# Patient Record
Sex: FEMALE | Race: WHITE | NOT HISPANIC OR LATINO | Employment: OTHER | URBAN - METROPOLITAN AREA
[De-identification: names, ages, dates, MRNs, and addresses within clinical notes are randomized per-mention and may not be internally consistent; named-entity substitution may affect disease eponyms.]

---

## 2023-12-04 ENCOUNTER — OFFICE VISIT (OUTPATIENT)
Dept: URGENT CARE | Facility: URGENT CARE | Age: 78
End: 2023-12-04
Payer: COMMERCIAL

## 2023-12-04 VITALS
RESPIRATION RATE: 16 BRPM | DIASTOLIC BLOOD PRESSURE: 82 MMHG | OXYGEN SATURATION: 96 % | SYSTOLIC BLOOD PRESSURE: 135 MMHG | HEART RATE: 78 BPM | WEIGHT: 164.5 LBS | TEMPERATURE: 97.7 F

## 2023-12-04 DIAGNOSIS — J03.90 TONSILLITIS WITH EXUDATE: Primary | ICD-10-CM

## 2023-12-04 DIAGNOSIS — H66.002 NON-RECURRENT ACUTE SUPPURATIVE OTITIS MEDIA OF LEFT EAR WITHOUT SPONTANEOUS RUPTURE OF TYMPANIC MEMBRANE: ICD-10-CM

## 2023-12-04 LAB
DEPRECATED S PYO AG THROAT QL EIA: NEGATIVE
GROUP A STREP BY PCR: NOT DETECTED

## 2023-12-04 PROCEDURE — 99203 OFFICE O/P NEW LOW 30 MIN: CPT | Performed by: FAMILY MEDICINE

## 2023-12-04 PROCEDURE — 87651 STREP A DNA AMP PROBE: CPT | Performed by: FAMILY MEDICINE

## 2023-12-04 RX ORDER — ASPIRIN 81 MG/1
81 TABLET ORAL
COMMUNITY

## 2023-12-04 RX ORDER — ATORVASTATIN CALCIUM 20 MG/1
20 TABLET, FILM COATED ORAL DAILY
COMMUNITY

## 2023-12-04 RX ORDER — CEFDINIR 300 MG/1
300 CAPSULE ORAL 2 TIMES DAILY
Qty: 20 CAPSULE | Refills: 0 | Status: SHIPPED | OUTPATIENT
Start: 2023-12-04 | End: 2023-12-14

## 2023-12-04 NOTE — PROGRESS NOTES
ASSESSMENT/PLAN:      ICD-10-CM    1. Tonsillitis with exudate  J03.90 Streptococcus A Rapid Screen w/Reflex to PCR - Clinic Collect     Group A Streptococcus PCR Throat Swab     cefdinir (OMNICEF) 300 MG capsule      2. Non-recurrent acute suppurative otitis media of left ear without spontaneous rupture of tympanic membrane  H66.002 cefdinir (OMNICEF) 300 MG capsule          Patient Instructions   Start cefdinir 1 tablet 2 times a day for 10 days for tonsillitis and left ear infection, if your second strep test comes back positive this medication will treat the strep as well    After being on antibiotics for 24 hours you are no longer can consider contagious        Return to clinic or to ER if symptoms worsen     Follow up with your primary care provider or clinic in about 2-3 days  if your symptoms do not improve          Reviewed medication instructions and side effects. Follow up if experiences side effects.     I reviewed supportive care, otc meds to use if needed, expected course, and signs of concern.  Follow up as needed or if she does not improve within  1-2 days or if worsens in any way.  Reviewed red flag symptoms and is to go to the ER if experiences any of these.     The use of Dragon/Mammotome dictation services may have been used to construct the content in this note; any grammatical or spelling errors are non-intentional. Please contact the author of this note directly if you are in need of any clarification.                Patient presents with:  Pharyngitis: Sore throat for 5-6 days, cold sx.        Subjective     Nataly Miner is a 78 year old female who presents to clinic today for the following health issues:    HPI   Sore throat for 5 days  pain with swallowing, even liquids,able to swallow her saliva  Pain on left > right, pain in left ear comes and goes, onset at time of sore throat   Mild congestion for last week  No fever chills no fever chills,  No cough, no sinus pain,   no shortness  breath or chest pain   No myalgias or fatigue  No n/v/d  Hx of GERD - taking omeprazole-no worsening symptoms, no hx of sore throat associated with GERD in past       No past medical history on file.  Social History     Tobacco Use    Smoking status: Not on file    Smokeless tobacco: Not on file   Substance Use Topics    Alcohol use: Not on file       Current Outpatient Medications   Medication Sig Dispense Refill    aspirin 81 MG EC tablet Take 81 mg by mouth      atorvastatin (LIPITOR) 20 MG tablet Take 20 mg by mouth daily      omeprazole (PRILOSEC) 20 MG DR capsule TAKE 1 CAPSULE BY MOUTH EVERY DAY BEFORE MEALS      ascorbic acid 1000 MG TABS tablet ASCORBIC ACID 1000 MG TABS       Allergies   Allergen Reactions    Penicillins Other (See Comments)     LOCAL RASH, hardened area at injection site     Patient Active Problem List    Diagnosis    Osteoarthritis    Dementia (H)    Osteoporosis    Cystocele, midline    Recurrent UTI    Osteoarthrosis    Dizziness and giddiness    Memory loss    Paresthesia    Speech disturbance    Chest pain    Chronic constipation    Pure hypercholesterolemia    Spinal stenosis    Huntley's esophagus    Sleep apnea            ROS are negative, except as otherwise noted HPI      Objective    /82 (BP Location: Left arm, Patient Position: Sitting, Cuff Size: Adult Regular)   Pulse 78   Temp 97.7  F (36.5  C) (Tympanic)   Resp 16   Wt 74.6 kg (164 lb 8 oz)   SpO2 96%   There is no height or weight on file to calculate BMI.  Physical Exam     GENERAL: Alert, mild distress.due to sore throat, fatigued   HEENT: Diffuse pharyngeal erythema, tonsillar tissue erythematous, + exudate    Sclera, lids and conjunctiva are normal.  Nose mild congestion and ears -tm on right dull, canal clear TM on left erythematous, dull, canal clear   NECK: shoddy anterior chain bilateral adenopathy.  CHEST:  clear, no wheezing, rhonchi or rales.  HEART:  S1 and S2 normal, no murmurs, clicks, gallops or  rubs. Regular rate and rhythm.  NEURO:Alert and oriented x3, normal strength and tone, normal gait        Diagnostic Test Results:  Labs reviewed in Epic  Results for orders placed or performed in visit on 12/04/23   Streptococcus A Rapid Screen w/Reflex to PCR - Clinic Collect     Status: Normal    Specimen: Throat; Swab   Result Value Ref Range    Group A Strep antigen Negative Negative   Group A Streptococcus PCR Throat Swab     Status: Normal    Specimen: Throat; Swab   Result Value Ref Range    Group A strep by PCR Not Detected Not Detected    Narrative    The Xpert Xpress Strep A test, performed on the [employer], is a rapid, qualitative in vitro diagnostic test for the detection of Streptococcus pyogenes (Group A ß-hemolytic Streptococcus, Strep A) in throat swab specimens from patients with signs and symptoms of pharyngitis. The Xpert Xpress Strep A test can be used as an aid in the diagnosis of Group A Streptococcal pharyngitis. The assay is not intended to monitor treatment for Group A Streptococcus infections. The Xpert Xpress Strep A test utilizes an automated real-time polymerase chain reaction (PCR) to detect Streptococcus pyogenes DNA.

## 2023-12-04 NOTE — PATIENT INSTRUCTIONS
Start cefdinir 1 tablet 2 times a day for 10 days for tonsillitis and left ear infection, if your second strep test comes back positive this medication will treat the strep as well    After being on antibiotics for 24 hours you are no longer can consider contagious        Return to clinic or to ER if symptoms worsen     Follow up with your primary care provider or clinic in about 2-3 days  if your symptoms do not improve

## 2024-01-16 PROBLEM — M19.90 OSTEOARTHRITIS: Status: ACTIVE | Noted: 2024-01-16

## 2024-01-16 PROBLEM — M81.0 OSTEOPOROSIS: Status: ACTIVE | Noted: 2019-04-30

## 2024-01-16 PROBLEM — N81.11 CYSTOCELE, MIDLINE: Status: ACTIVE | Noted: 2018-05-24

## 2024-01-16 PROBLEM — F03.90 DEMENTIA (H): Status: ACTIVE | Noted: 2023-03-31

## 2024-04-24 PROBLEM — M81.0 OSTEOPOROSIS: Chronic | Status: ACTIVE | Noted: 2019-04-30

## 2024-04-24 PROBLEM — F03.90 DEMENTIA (H): Chronic | Status: ACTIVE | Noted: 2023-03-31

## 2024-04-25 ENCOUNTER — OFFICE VISIT (OUTPATIENT)
Dept: SLEEP MEDICINE | Facility: CLINIC | Age: 79
End: 2024-04-25
Payer: COMMERCIAL

## 2024-04-25 VITALS
OXYGEN SATURATION: 95 % | HEART RATE: 74 BPM | DIASTOLIC BLOOD PRESSURE: 81 MMHG | WEIGHT: 168.8 LBS | RESPIRATION RATE: 16 BRPM | BODY MASS INDEX: 31.87 KG/M2 | HEIGHT: 61 IN | SYSTOLIC BLOOD PRESSURE: 143 MMHG

## 2024-04-25 DIAGNOSIS — F51.04 CHRONIC INSOMNIA: ICD-10-CM

## 2024-04-25 DIAGNOSIS — G47.30 SLEEP APNEA, UNSPECIFIED TYPE: Primary | ICD-10-CM

## 2024-04-25 DIAGNOSIS — F03.A0 MILD DEMENTIA WITHOUT BEHAVIORAL DISTURBANCE, PSYCHOTIC DISTURBANCE, MOOD DISTURBANCE, OR ANXIETY, UNSPECIFIED DEMENTIA TYPE (H): Chronic | ICD-10-CM

## 2024-04-25 PROBLEM — G89.29 CHRONIC PAIN OF RIGHT THUMB: Status: ACTIVE | Noted: 2024-04-12

## 2024-04-25 PROBLEM — D69.2 PURPURA (H): Status: ACTIVE | Noted: 2024-03-18

## 2024-04-25 PROBLEM — M25.561 CHRONIC PAIN OF RIGHT KNEE: Status: ACTIVE | Noted: 2024-04-12

## 2024-04-25 PROBLEM — G89.29 CHRONIC PAIN OF RIGHT KNEE: Status: ACTIVE | Noted: 2024-04-12

## 2024-04-25 PROBLEM — M79.644 CHRONIC PAIN OF RIGHT THUMB: Status: ACTIVE | Noted: 2024-04-12

## 2024-04-25 PROCEDURE — 99205 OFFICE O/P NEW HI 60 MIN: CPT | Performed by: INTERNAL MEDICINE

## 2024-04-25 RX ORDER — GARLIC 180 MG
1 TABLET, DELAYED RELEASE (ENTERIC COATED) ORAL DAILY
COMMUNITY

## 2024-04-25 RX ORDER — MULTIVITAMIN
1 TABLET ORAL DAILY
COMMUNITY

## 2024-04-25 ASSESSMENT — SLEEP AND FATIGUE QUESTIONNAIRES
HOW LIKELY ARE YOU TO NOD OFF OR FALL ASLEEP IN A CAR, WHILE STOPPED FOR A FEW MINUTES IN TRAFFIC: WOULD NEVER DOZE
HOW LIKELY ARE YOU TO NOD OFF OR FALL ASLEEP WHEN YOU ARE A PASSENGER IN A CAR FOR AN HOUR WITHOUT A BREAK: SLIGHT CHANCE OF DOZING
HOW LIKELY ARE YOU TO NOD OFF OR FALL ASLEEP WHILE SITTING AND TALKING TO SOMEONE: WOULD NEVER DOZE
HOW LIKELY ARE YOU TO NOD OFF OR FALL ASLEEP WHILE SITTING INACTIVE IN A PUBLIC PLACE: WOULD NEVER DOZE
HOW LIKELY ARE YOU TO NOD OFF OR FALL ASLEEP WHILE LYING DOWN TO REST IN THE AFTERNOON WHEN CIRCUMSTANCES PERMIT: SLIGHT CHANCE OF DOZING
HOW LIKELY ARE YOU TO NOD OFF OR FALL ASLEEP WHILE SITTING AND READING: SLIGHT CHANCE OF DOZING
HOW LIKELY ARE YOU TO NOD OFF OR FALL ASLEEP WHILE WATCHING TV: WOULD NEVER DOZE
HOW LIKELY ARE YOU TO NOD OFF OR FALL ASLEEP WHILE SITTING QUIETLY AFTER LUNCH WITHOUT ALCOHOL: WOULD NEVER DOZE

## 2024-04-25 NOTE — PATIENT INSTRUCTIONS
Consider use of lightbox (10,000 lux, full spectrum or 450-500 nanometers) for 30-60 minutes every evening.   Don't start day or turn things on until after 5 AM   Avoid light exposure, consider use of sunglasses for the first 2 hours of the morning  Consider use of melatonin 1-3 mg in morning

## 2024-04-25 NOTE — PROGRESS NOTES
Outpatient Sleep Medicine Consultation:      Name: Nataly Miner MRN# [id number]   Age: 78 year old YOB: 1945     Date of Consultation: April 25, 2024  Consultation is requested by: No referring provider defined for this encounter. No ref. provider found  Primary care provider: [facility] - [facility]       Reason for Sleep Consult:           Patient s Reason for visit  Nataly Miner main reason for visit: declining short term memory  Patient states problem(s) started: 3 years  Nataly Miner's goals for this visit: to find out more about her memory loss           Assessment and Plan:     Obstructive sleep apnea, unknown severity  Recently untreated with symptoms of sleep maintenance difficulties, possibly unrelated   Comorbid dementia  Not using PAP recently due to basal congestion, but wants to continue  We do not need to treat her JOSH given her age, lack of co-morbidiities or apparent symptoms   - Get records from [facility], need sleep study   - Continue current treatments.   - Trial of benadryl if needed  - Wants to change DME to [facility]. Order supplies when sleep study available      Sleep maintenance difficulties (WALTER 26)  Difficult history due to memory problems   Suspect advanced sleep phase syndrome complicated by sleep disordered breathing.  We discussed stimulus control and regular wake times.   - See patient instructions         I spent 35 minutes with patient including counseling, and 10+10 minutes with chart review, and documentation              History of Present Illness:     DME ?unknown in [place], wants to change DME to [facility]     She had a sleep study in 1990s, but does not know the location or the reason why    She was presscribed CPAP     She has not seen a sleep doctor since about 2018    She stopped using CPAP about 1-2 year ago. She does not recall why. She was frustrated because DME kept sending her supplies that she did not need. She guzman  not know the name of the DME    Her CPAP is in her possesion     Patient is here with a friend from [facility]    What type of mask do you use:   nasal pillows    ResMed   Auto-PAP 7 - 9 cmH2O 90 day usage data:    42% of days with > 4 hours of use. 51/90 days with no use.   Average use 4' 38 minutes per day.   95%ile Leak 15 L/min.   CPAP 95% pressure 8 cm.   AHI 3.6 events per hour.      When we discuss the download she does initially not seem to recall she was using it  Then she recall she did use and stopped because of nasal congestion and concer it was 'contaminated  She does not recall if she had problems staying asleep with CPAP on       SLEEP-WAKE SCHEDULE:     Work/School Days: Patient goes to school/work: No   Usually gets into bed at 10pm  Takes patient about pretty fast to fall asleep  Has trouble falling asleep 0 nights per week  Wakes up in the middle of the night 1 times.  Wakes up due to Use the bathroom  She has trouble falling back asleep 7 nights per week times a week   This has been a problem for about a year or longer. She is not sleepy   She rolls aroun\d, lays there, thinks   It usually takes Doesnt fall back asleep at all after 3-4, she will just get up and watch TV  Patient is usually up at 5am  Uses alarm: No    Weekends/Non-work Days/All Other Days:  Usually gets into bed at 10pm   Takes patient about not long to fall asleep  Patient is usually up at 5 am  Uses alarm:      Sleep Need  Patient gets  5 hours at the most sleep on average   Patient thinks she needs about 7hours sleep    Nataly Miner prefers to sleep in this position(s): Back   Patient states they do the following activities in bed:      Naps  Patient takes a purposeful nap 3times a week times a week at 3 PM and naps are usually 1 hour approx in duration  She feels better after a nap: No  She dozes off unintentionally 3 to 4 days per week  Patient has had a driving accident or near-miss due to sleepiness/drowsiness:  No      SLEEP DISRUPTIONS:    Breathing/Snoring  Patient snores:Yes  Other people complain about her snoring: No  Patient has been told she stops breathing in her sleep:Yes  She has issues with the following:      Movement:  Patient gets pain, discomfort, with an urge to move:  No  It happens when she is resting:  No  It happens more at night:  No  Patient has been told she kicks her legs at night:  No     Behaviors in Sleep:  Nataly Miner has experienced the following behaviors while sleeping:    She has experienced sudden muscle weakness during the day: No      Is there anything else you would like your sleep provider to know:      She denies fatigue, Excessive daytime sleepiness     CAFFEINE AND OTHER SUBSTANCES:    Patient consumes caffeinated beverages per day:  1 to 3  Last caffeine use is usually: noonish  List of any prescribed or over the counter stimulants that patient takes: none  List of any prescribed or over the counter sleep medication patient takes: none  List of previous sleep medications that patient has tried: none  Patient drinks alcohol to help them sleep: No  Patient drinks alcohol near bedtime: Yes    Family History:  Patient has a family member been diagnosed with a sleep disorder: No            SCALES:    EPWORTH SLEEPINESS SCALE         4/25/2024    12:45 PM    [place] Sleepiness Scale ( PATY Ewing  [id number]<br>ESS - [place]/English - Final version - 21 Nov 07 - [facility] [place].)   Sitting and reading Slight chance of dozing   Watching TV Would never doze   Sitting, inactive in a public place (e.g. a theatre or a meeting) Would never doze   As a passenger in a car for an hour without a break Slight chance of dozing   Lying down to rest in the afternoon when circumstances permit Slight chance of dozing   Sitting and talking to someone Would never doze   Sitting quietly after a lunch without alcohol Would never doze   In a car, while stopped for a few minutes in traffic Would never doze    [place] Score (MC) 3   [place]oa Score (Sleep) 3         INSOMNIA SEVERITY INDEX (WALTER)          4/25/2024    12:45 PM   Insomnia Severity Index (WALTER)   Difficulty falling asleep 2   Difficulty staying asleep 4   Problems waking up too early 4   How SATISFIED/DISSATISFIED are you with your CURRENT sleep pattern? 4   How NOTICEABLE to others do you think your sleep problem is in terms of impairing the quality of your life? 4   How WORRIED/DISTRESSED are you about your current sleep problem? 4   To what extent do you consider your sleep problem to INTERFERE with your daily functioning (e.g. daytime fatigue, mood, ability to function at work/daily chores, concentration, memory, mood, etc.) CURRENTLY? 4   WALTER Total Score 26       Guidelines for Scoring/Interpretation:  Total score categories:  0-7 = No clinically significant insomnia   8-14 = Subthreshold insomnia   15-21 = Clinical insomnia (moderate severity)  22-28 = Clinical insomnia (severe)  Used via courtesy of www.Ry[place].va.gov with permission from Kyree Gorman PhD., [facility]           Allergies:    Allergies   Allergen Reactions    Penicillins Other (See Comments)     LOCAL RASH, hardened area at injection site       Medications:    Current Outpatient Medications   Medication Sig Dispense Refill    ascorbic acid 1000 MG TABS tablet ASCORBIC ACID 1000 MG TABS      aspirin 81 MG EC tablet Take 81 mg by mouth      atorvastatin (LIPITOR) 20 MG tablet Take 20 mg by mouth daily      CALCIUM PO Take 1 tablet by mouth daily      ginkgo biloba 60 MG CAPS capsule Take 1 capsule by mouth daily      MELATONIN PO Take 1 tablet by mouth at bedtime      multivitamin w/minerals (MULTI-VITAMIN) tablet Take 1 tablet by mouth daily      omeprazole (PRILOSEC) 20 MG DR capsule TAKE 1 CAPSULE BY MOUTH EVERY DAY BEFORE MEALS      VITAMIN D PO Take 1 tablet by mouth daily         Problem List:  Patient Active Problem List    Diagnosis Date Noted    Dementia (H) 03/31/2023      Priority: High    Pure hypercholesterolemia 02/28/2013     Priority: Medium    Huntley's esophagus 07/12/2007     Priority: Medium     Formatting of this note might be different from the original.   EGD X 2. [place]   EGD and biopsy 7/2007      Sleep apnea 09/18/2006     Priority: Medium     Moderate JOSH with significant desaturation  8/30/2006.        Chronic pain of right knee 04/12/2024     Priority: Low    Chronic pain of right thumb 04/12/2024     Priority: Low    Purpura (H24) 03/18/2024     Priority: Low    Osteoarthritis 01/16/2024     Priority: Low    Osteoporosis 04/30/2019     Priority: Low      Had severe pain with fosamax.       Cystocele, midline 05/24/2018     Priority: Low    Recurrent UTI 06/27/2016     Priority: Low    Paresthesia 05/07/2014     Priority: Low    Chronic constipation 02/28/2013     Priority: Low    Spinal stenosis 12/11/2012     Priority: Low      2/26/2013 - Revision Decompression L5-S1, Decompression L4-5      Diverticular disease of colon 09/06/2012     Priority: Low        Past Medical/Surgical History:  Past Medical History:   Diagnosis Date    Chest pain 10/16/2013    Dizziness and giddiness 05/07/2014    Speech disturbance 05/07/2014     Past Surgical History:   Procedure Laterality Date    BACK SURGERY  12/11/2012    DECOMPRESSION L5-S1 12/11/2012    BACK SURGERY  02/26/2013    LUMBAR 4-5, LUMBAR 5 - SACRAL 1 REVISION DECOMPRESSION 2/26/13   LUMBAR 4-5, LUMBAR 5 - SACRAL 1 REVISION DECOMPRESSION    CHOLECYSTECTOMY  09/1996    HYSTERECTOMY  1991    FLAKO BSO 1991 DR MCGRATH 1991 total hysterctomy and Marshal/ [place]    RECTOCELE REPAIR  12/01/2008    REPAIR Rectocele, ANTERIOR AND POSTERIOR VAGINAL 12/1/08   Dr Coello    TONSILLECTOMY  1952       Social History:  Social History     Socioeconomic History    Marital status:      Spouse name: Not on file    Number of children: Not on file    Years of education: Not on file    Highest education level: Not on file  "  Occupational History    Occupation: Retired OT provider at [employer]   Tobacco Use    Smoking status: Never     Passive exposure: Past    Smokeless tobacco: Never   Vaping Use    Vaping status: Never Used   Substance and Sexual Activity    Alcohol use: Yes     Comment: 1/2 cup wine at night    Drug use: Never    Sexual activity: Not on file   Other Topics Concern    Not on file   Social History Narrative    Not on file     Social Determinants of Health     Financial Resource Strain: Low Risk  (3/18/2024)    [facility]    Financial Resource Strain     Difficulty of Paying Living Expenses: 3     Difficulty of Paying Living Expenses: Not on file   Food Insecurity: Food Insecurity Present (3/18/2024)    [facility]    Food Insecurity     Worried About Running Out of Food in the Last Year: 2   Transportation Needs: No Transportation Needs (3/18/2024)    Received from [facility]    Transportation Needs     Lack of Transportation (Medical): 1   Physical Activity: Not on file   Stress: Not on file   Social Connections: Socially Integrated (3/18/2024)    Received from [facility]    Social Connections     Frequency of Communication with Friends and Family: 0   Interpersonal Safety: Not on file   Housing Stability: Low Risk  (3/18/2024)    Received from [facility]    Housing Stability     Unable to Pay for Housing in the Last Year: 1       Family History:  No family history on file.      Physical Examination:  Vitals: BP (!) 143/81   Pulse 74   Resp 16   Ht 1.549 m (5' 1\")   Wt 76.6 kg (168 lb 12.8 oz)   SpO2 95%   BMI 31.89 kg/m    BMI= Body mass index is 31.89 kg/m .    Neck Cir (cm): 34 cm    SpO2 Readings from Last 4 Encounters:   04/25/24 95%   12/04/23 96%       GENERAL APPEARANCE: alert and no distress  EYES: Eyes grossly " normal to inspection  HENT: mouth without ulcers or lesions  NECK: not generous size  LUNGS: no shortness of breath , cough  NEURO: mentation intact, speech normal and cranial nerves 2-12 appear intact  PSYCH: affect normal/bright           Data: All pertinent previous laboratory data reviewed     No results for input(s)

## 2024-04-25 NOTE — NURSING NOTE
"Chief Complaint   Patient presents with    Sleep Problem     Patient presents to clinic for a consult on concerns with significant memory loss.       Initial BP (!) 143/81   Pulse 74   Resp 16   Ht 1.549 m (5' 1\")   Wt 76.6 kg (168 lb 12.8 oz)   SpO2 95%   BMI 31.89 kg/m   Estimated body mass index is 31.89 kg/m  as calculated from the following:    Height as of this encounter: 1.549 m (5' 1\").    Weight as of this encounter: 76.6 kg (168 lb 12.8 oz).    Medication Reconciliation: complete  ESS: 3  Neck circumference: 13.50 inches / 34 centimeters.  DME: N/A  Deborah Mary CMA      "

## 2024-04-25 NOTE — NURSING NOTE
Faxed T[facility] to [facility] at [phone] requested that they fax us patient's sleep study. 1 year appointment reminder will be sent via My Chart.   Deborah Mary, RIKI

## 2024-04-26 ENCOUNTER — TELEPHONE (OUTPATIENT)
Dept: SLEEP MEDICINE | Facility: CLINIC | Age: 79
End: 2024-04-26
Payer: COMMERCIAL

## 2024-04-26 DIAGNOSIS — G47.33 OSA (OBSTRUCTIVE SLEEP APNEA): Primary | Chronic | ICD-10-CM

## 2024-04-26 NOTE — TELEPHONE ENCOUNTER
Received records from [facility] today. They have been scanned into chart. Sleep Study in PROC. And Historical Sleep Medicine Progress notes in MEDIA. Both attached to this visit. TUYET also scanned into MEDIA. Can provider please send order for DME supplies to [facility].  Deborah Mary CMA

## 2024-04-26 NOTE — NURSING NOTE
Received records from [facility] today. They have been scanned into chart. Sleep Study in PROC. And Historical Sleep Medicine Progress notes in MEDIA. Both attached to this visit. TUYET also scanned into MEDIA.  Deborah Mary CMA

## 2024-04-27 NOTE — TELEPHONE ENCOUNTER
Polysomnogram [facility] 8/30/06 (173#)  - AHI 20, RDI 23, low O2 78% (while supine, non-REM)  - CPAP titrated to 8 with REM supine seen

## 2024-06-27 ENCOUNTER — TELEPHONE (OUTPATIENT)
Dept: SLEEP MEDICINE | Facility: CLINIC | Age: 79
End: 2024-06-27
Payer: COMMERCIAL

## 2024-06-27 NOTE — TELEPHONE ENCOUNTER
FYI - Status Update    Who is Calling: patient    Update: Patient is calling in as she thinks that she should be having a sleep study done, patient stopped using her cpap due to age of the cpap and not sure how effective it is.  Patient's goal is to start using her cpap machine again.  Patient has short term memory loss and feels like if she used the cpap it could help with her memory.     Does caller want a call/response back: Yes     Okay to leave a detailed message?: Yes at Cell number on file:    Telephone Information:   Mobile [phone]

## 2024-06-27 NOTE — TELEPHONE ENCOUNTER
Chart reviewed.     Last ov 4/25/24    Routing to provider to advise    Debora KENNEDY RN  [facility]

## 2024-06-28 NOTE — TELEPHONE ENCOUNTER
Patient notified supply order was sent and good for one year. Phone number for [facility] provided. Reminded patient to follow up in one year.    Debora KENNEDY RN  [facility]

## 2024-08-08 ENCOUNTER — TELEPHONE (OUTPATIENT)
Dept: SLEEP MEDICINE | Facility: CLINIC | Age: 79
End: 2024-08-08
Payer: COMMERCIAL

## 2025-03-04 ENCOUNTER — OFFICE VISIT (OUTPATIENT)
Dept: URGENT CARE | Facility: URGENT CARE | Age: 80
End: 2025-03-04
Payer: COMMERCIAL

## 2025-03-04 ENCOUNTER — TELEPHONE (OUTPATIENT)
Dept: URGENT CARE | Facility: URGENT CARE | Age: 80
End: 2025-03-04

## 2025-03-04 VITALS
WEIGHT: 166.5 LBS | SYSTOLIC BLOOD PRESSURE: 128 MMHG | HEART RATE: 95 BPM | BODY MASS INDEX: 31.46 KG/M2 | RESPIRATION RATE: 24 BRPM | TEMPERATURE: 98.4 F | DIASTOLIC BLOOD PRESSURE: 80 MMHG | OXYGEN SATURATION: 96 %

## 2025-03-04 DIAGNOSIS — R07.0 THROAT PAIN: Primary | ICD-10-CM

## 2025-03-04 LAB
DEPRECATED S PYO AG THROAT QL EIA: NEGATIVE
S PYO DNA THROAT QL NAA+PROBE: NOT DETECTED

## 2025-03-04 PROCEDURE — 87651 STREP A DNA AMP PROBE: CPT | Performed by: PHYSICIAN ASSISTANT

## 2025-03-04 PROCEDURE — 99213 OFFICE O/P EST LOW 20 MIN: CPT | Performed by: PHYSICIAN ASSISTANT

## 2025-03-04 RX ORDER — LIDOCAINE HYDROCHLORIDE 20 MG/ML
15 SOLUTION OROPHARYNGEAL
Qty: 100 ML | Refills: 0 | Status: SHIPPED | OUTPATIENT
Start: 2025-03-04

## 2025-03-04 ASSESSMENT — ENCOUNTER SYMPTOMS
SORE THROAT: 1
SHORTNESS OF BREATH: 0
NAUSEA: 0
CHILLS: 0
PALPITATIONS: 0
DIARRHEA: 0
SINUS PAIN: 0
CARDIOVASCULAR NEGATIVE: 1
WHEEZING: 0
FEVER: 0
FATIGUE: 0
VOMITING: 0
SINUS PRESSURE: 0
HEADACHES: 1
COUGH: 0
RHINORRHEA: 0

## 2025-03-04 NOTE — PROGRESS NOTES
Subjective   Nataly is a 79 year old, presenting for the following health issues:  Pharyngitis (C/o sore throat and cold sx for over a week, would like to make sure it is not strep. ), Allergies (Sneezing over a week), and Nasal Congestion (Stuffy nose over a week )    HPI    Acute Illness  Acute illness concerns:   Onset/Duration: 7-8 days ago  Symptoms:  Fever: No  Chills/Sweats: No  Headache (location?): Sometimes  Sinus Pressure: No  Conjunctivitis:  No  Ear Pain: no  Rhinorrhea: No  Congestion: No  Sore Throat: YES  Cough: no  Wheeze: No  Decreased Appetite: No  Nausea: No  Vomiting: No  Diarrhea: No  Dysuria/Freq.: No  Dysuria or Hematuria: No  Fatigue/Achiness: No  Sick/Strep Exposure: No  Therapies tried and outcome: tea, tylenol  Patient Active Problem List   Diagnosis    Huntley's esophagus    Chronic constipation    Cystocele, midline    Dementia (H)    Osteoarthritis    Osteoporosis    Paresthesia    Pure hypercholesterolemia    Recurrent UTI    JOSH (obstructive sleep apnea) - moderate (AHI 20)    Spinal stenosis    Chronic pain of right knee    Chronic pain of right thumb    Diverticular disease of colon    Purpura    Chronic insomnia     Current Outpatient Medications   Medication Sig Dispense Refill    ascorbic acid 1000 MG TABS tablet ASCORBIC ACID 1000 MG TABS      aspirin 81 MG EC tablet Take 81 mg by mouth      atorvastatin (LIPITOR) 20 MG tablet Take 20 mg by mouth daily      CALCIUM PO Take 1 tablet by mouth daily      ginkgo biloba 60 MG CAPS capsule Take 1 capsule by mouth daily      lidocaine, viscous, (XYLOCAINE) 2 % solution Swish and spit 15 mLs in mouth every 3 hours as needed for moderate pain. ; Max 8 doses/24 hour period. 100 mL 0    MELATONIN PO Take 1 tablet by mouth at bedtime      multivitamin w/minerals (MULTI-VITAMIN) tablet Take 1 tablet by mouth daily      omeprazole (PRILOSEC) 20 MG DR capsule TAKE 1 CAPSULE BY MOUTH EVERY DAY BEFORE MEALS      VITAMIN D PO Take 1 tablet by  mouth daily       No current facility-administered medications for this visit.        Allergies   Allergen Reactions    Penicillins Other (See Comments)     LOCAL RASH, hardened area at injection site     Review of Systems   Constitutional:  Negative for chills, fatigue and fever.   HENT:  Positive for sneezing and sore throat. Negative for congestion, ear pain, rhinorrhea, sinus pressure and sinus pain.    Respiratory:  Negative for cough, shortness of breath and wheezing.    Cardiovascular: Negative.  Negative for chest pain, palpitations and leg swelling.   Gastrointestinal:  Negative for diarrhea, nausea and vomiting.   Neurological:  Positive for headaches.   All other systems reviewed and are negative.          Objective    /80 (BP Location: Left arm, Patient Position: Sitting, Cuff Size: Adult Regular)   Pulse 95   Temp 98.4  F (36.9  C) (Tympanic)   Resp 24   Wt 75.5 kg (166 lb 8 oz)   SpO2 96%   BMI 31.46 kg/m    Body mass index is 31.46 kg/m .  Physical Exam  Vitals and nursing note reviewed.   Constitutional:       General: She is not in acute distress.     Appearance: Normal appearance. She is well-developed and normal weight. She is not ill-appearing.   HENT:      Head: Normocephalic and atraumatic.      Comments: TMs are intact without any erythema or bulging bilaterally.  Airway is patent.     Nose: Nose normal.      Mouth/Throat:      Mouth: Mucous membranes are moist.      Pharynx: Uvula midline. Posterior oropharyngeal erythema present. No pharyngeal swelling or oropharyngeal exudate.      Tonsils: No tonsillar exudate.   Eyes:      General: No scleral icterus.     Extraocular Movements: Extraocular movements intact.      Conjunctiva/sclera: Conjunctivae normal.      Pupils: Pupils are equal, round, and reactive to light.   Neck:      Thyroid: No thyromegaly.   Cardiovascular:      Rate and Rhythm: Normal rate and regular rhythm.      Pulses: Normal pulses.      Heart sounds: Normal  heart sounds, S1 normal and S2 normal. No murmur heard.     No friction rub. No gallop.   Pulmonary:      Effort: Pulmonary effort is normal. No accessory muscle usage, respiratory distress or retractions.      Breath sounds: Normal breath sounds and air entry. No stridor. No decreased breath sounds, wheezing, rhonchi or rales.   Musculoskeletal:      Cervical back: Normal range of motion and neck supple.   Lymphadenopathy:      Cervical: No cervical adenopathy.   Skin:     General: Skin is warm and dry.      Nails: There is no clubbing.   Neurological:      Mental Status: She is alert and oriented to person, place, and time.   Psychiatric:         Mood and Affect: Mood normal.         Behavior: Behavior normal.         Thought Content: Thought content normal.         Judgment: Judgment normal.        Results for orders placed or performed in visit on 03/04/25   Streptococcus A Rapid Screen w/Reflex to PCR - Clinic Collect     Status: Normal    Specimen: Throat; Swab   Result Value Ref Range    Group A Strep antigen Negative Negative           Assessment/Plan:  Throat pain:  Rapid strep was negative, discussed will call her with culture results if positive, she does not use a computer. She states her pain is not so bad that she would want to use the lidocaine solution.  Recommend tylenol/ibuprofen prn pain/fever, warm salt water gargles, lozenges or cough drops. Discussed return precautions.  -     Streptococcus A Rapid Screen w/Reflex to PCR - Clinic Collect  -     Group A Streptococcus PCR Throat Swab  -     lidocaine, viscous, (XYLOCAINE) 2 % solution; Swish and spit 15 mLs in mouth every 3 hours as needed for moderate pain. ; Max 8 doses/24 hour period.      Laxmi Mcallister Student NP      Physician Attestation   I, Cherrie Munoz PA-C, was present with the medical/ANIYA student who participated in the service and in the documentation of the note.  I have verified the history and personally performed the  physical exam and medical decision making.  I agree with the assessment and plan of care as documented in the note.        Cherrie See YURIY Munoz

## 2025-03-04 NOTE — TELEPHONE ENCOUNTER
Patient calling wanting a note to get a call regarding strep test results. She states she has been in quarantine and does not want to wonder if she is positive or negative.     Looks like results have resulted for strep swab, she tested negative. RN relayed results to patient, did let pt know per note a letter would be sent out.     Pt verbalized understanding and appreciative. No further questions/concerns.         Poonam Hermosillo RN    [facility]

## 2025-03-04 NOTE — LETTER
March 4, 2025      Nataly Miner  [address]    [facility] [place] [address]        Dear ,    We are writing to inform you of your test results.    Your test results fall within the expected range(s). The further Strep test is negative.    Resulted Orders   Streptococcus A Rapid Screen w/Reflex to PCR - Clinic Collect   Result Value Ref Range    Group A Strep antigen Negative Negative   Group A Streptococcus PCR Throat Swab   Result Value Ref Range    Group A strep by PCR Not Detected Not Detected    Narrative    The Xpert Xpress Strep A test, performed on the [employer], is a rapid, qualitative in vitro diagnostic test for the detection of Streptococcus pyogenes (Group A ß-hemolytic Streptococcus, Strep A) in throat swab specimens from patients with signs and symptoms of pharyngitis. The Xpert Xpress Strep A test can be used as an aid in the diagnosis of Group A Streptococcal pharyngitis. The assay is not intended to monitor treatment for Group A Streptococcus infections. The Xpert Xpress Strep A test utilizes an automated real-time polymerase chain reaction (PCR) to detect Streptococcus pyogenes DNA.       If you have any questions or concerns, please call the clinic at the number listed above.       Sincerely,      Cherrie Munoz PA-C    Electronically signed